# Patient Record
Sex: FEMALE | Race: WHITE | NOT HISPANIC OR LATINO
[De-identification: names, ages, dates, MRNs, and addresses within clinical notes are randomized per-mention and may not be internally consistent; named-entity substitution may affect disease eponyms.]

---

## 2019-11-06 ENCOUNTER — RESULT REVIEW (OUTPATIENT)
Age: 74
End: 2019-11-06

## 2020-08-18 ENCOUNTER — EMERGENCY (EMERGENCY)
Facility: HOSPITAL | Age: 75
LOS: 1 days | Discharge: HOME | End: 2020-08-18
Attending: STUDENT IN AN ORGANIZED HEALTH CARE EDUCATION/TRAINING PROGRAM
Payer: MEDICARE

## 2020-08-18 VITALS
DIASTOLIC BLOOD PRESSURE: 79 MMHG | HEIGHT: 61 IN | OXYGEN SATURATION: 97 % | WEIGHT: 119.93 LBS | TEMPERATURE: 98 F | SYSTOLIC BLOOD PRESSURE: 132 MMHG | RESPIRATION RATE: 18 BRPM | HEART RATE: 62 BPM

## 2020-08-18 VITALS
HEART RATE: 73 BPM | OXYGEN SATURATION: 98 % | TEMPERATURE: 99 F | RESPIRATION RATE: 18 BRPM | DIASTOLIC BLOOD PRESSURE: 84 MMHG | SYSTOLIC BLOOD PRESSURE: 162 MMHG

## 2020-08-18 PROCEDURE — 25605 CLTX DST RDL FX/EPHYS SEP W/: CPT | Mod: LT

## 2020-08-18 PROCEDURE — 73110 X-RAY EXAM OF WRIST: CPT

## 2020-08-18 PROCEDURE — 73090 X-RAY EXAM OF FOREARM: CPT | Mod: 26,LT

## 2020-08-18 PROCEDURE — 99285 EMERGENCY DEPT VISIT HI MDM: CPT | Mod: 25

## 2020-08-18 PROCEDURE — 73110 X-RAY EXAM OF WRIST: CPT | Mod: 26,LT

## 2020-08-18 PROCEDURE — 73090 X-RAY EXAM OF FOREARM: CPT

## 2020-08-18 PROCEDURE — 99284 EMERGENCY DEPT VISIT MOD MDM: CPT

## 2020-08-18 PROCEDURE — 73080 X-RAY EXAM OF ELBOW: CPT | Mod: 26,LT

## 2020-08-18 PROCEDURE — 73080 X-RAY EXAM OF ELBOW: CPT

## 2020-08-18 RX ORDER — ACETAMINOPHEN 500 MG
650 TABLET ORAL ONCE
Refills: 0 | Status: COMPLETED | OUTPATIENT
Start: 2020-08-18 | End: 2020-08-18

## 2020-08-18 RX ORDER — LIDOCAINE HCL 20 MG/ML
10 VIAL (ML) INJECTION ONCE
Refills: 0 | Status: DISCONTINUED | OUTPATIENT
Start: 2020-08-18 | End: 2020-08-22

## 2020-08-18 NOTE — CONSULT NOTE ADULT - SUBJECTIVE AND OBJECTIVE BOX
75yFemale c/o L wrist pain s/p mechanical fall 1 week ago. Patient denies head hit or LOC. Patient denies numbness or tingling in the LUE. Patient denies any other injuries.    PMH:  HLD (hyperlipidemia)  HTN (hypertension)    PSH:    AH:    Meds: See med rec    T(C): 36.7 (08-18-20 @ 11:29)  HR: 62 (08-18-20 @ 11:29)  BP: 132/79 (08-18-20 @ 11:29)  RR: 18 (08-18-20 @ 11:29)  SpO2: 97% (08-18-20 @ 11:29)  Wt(kg): --    Gen: NAD  PE LUE:  Skin intact,   visible deformity of wrist,   SILT med/rad/ulnar/axillary  +AIN/PIN/Ulnar/Radial/Musc/Median,   +shoulder/elbow ROM,   wrist ROM limited 2/2 pain,   +radial pulse, soft compartments,    Secondary:  No TTP over bony landmarks, SILT BL, ROM intact BL, distal pulses palpable.    Imaging:  XR demonstrating L distal radius fracture    Procedure:  Under aseptic conditions, a hematoma block was administered to the fracture site using 10cc of 1% lidocaine. Closed reduction was performed and a well molded plaster splint was applied. The patient tolerated the procedure well and there we no complications. The patient was neurovascularly intact following reduction. Post-reduction xrays demonstrated acceptable alignment.         A/P:  75yoF   s/p reduction of L distal radius fracture    - pain control  - NWB LUE in splint  - splint precuations  - possible need for future surgery explained to the patient  - FU with Dr. Garcia in 1 week. Please call office for an appointment. [phone]

## 2020-08-18 NOTE — ED ADULT NURSE NOTE - NSIMPLEMENTINTERV_GEN_ALL_ED
Implemented All Universal Safety Interventions:  [place] to call system. Call bell, personal items and telephone within reach. Instruct patient to call for assistance. Room bathroom lighting operational. Non-slip footwear when patient is off stretcher. Physically safe environment: no spills, clutter or unnecessary equipment. Stretcher in lowest position, wheels locked, appropriate side rails in place.

## 2020-08-18 NOTE — ED PROVIDER NOTE - CARE PROVIDER_API CALL
Cristino Garcia  ORTHOPAEDIC SURGERY  [address], [address]  [place] [address]  Phone: [phone]  Fax: [phone]  Follow Up Time:

## 2020-08-18 NOTE — ED PROVIDER NOTE - ADDITIONAL NOTES AND INSTRUCTIONS:
Please excuse Ms. Olivares's son for 8/18/20 and for whatever day she is scheduled to follow up with her outpatient doctor as he will need to provide transportation for her.

## 2020-08-18 NOTE — ED PROVIDER NOTE - PATIENT PORTAL LINK FT
You can access the FollowMyHealth Patient Portal offered by [facility] by registering at the following website: [email or web address]. By joining [facility]’s FollowMyHealth portal, you will also be able to view your health information using other applications (apps) compatible with our system.

## 2020-08-18 NOTE — ED PROVIDER NOTE - NS ED ROS FT
General: no weakness, no fatigue, no change in wt  HEENT: No congestion, no blurry vision, no odynophagia, no rhinorrhea, no ear pain, no throat pain  Respiratory: No cough, no shortness of breath  Cardiac: No chest pain, no palpitations  GI: No abdominal pain, no diarrhea, no vomiting, no nausea, no constipation  : No dysuria, no hematuria  MSK: (+) swelling in L distal arm and wrist; no back pain  Neuro: No headache, no dizziness

## 2020-08-18 NOTE — ED PROVIDER NOTE - PHYSICAL EXAMINATION
VITALS:   T(C): 36.7 (08-18-20 @ 11:29), Max: 36.7 (08-18-20 @ 11:29)  HR: 62 (08-18-20 @ 11:29) (62 - 62)  BP: 132/79 (08-18-20 @ 11:29) (132/79 - 132/79)  RR: 18 (08-18-20 @ 11:29) (18 - 18)  SpO2: 97% (08-18-20 @ 11:29) (97% - 97%)    GENERAL: NAD, lying in bed comfortably  HEAD:  Atraumatic, normocephalic  EYES: EOMI, PERRLA, conjunctiva and sclera clear  ENT: Moist mucous membranes  NECK: Supple, no JVD  HEART: Regular rate and rhythm, no murmurs, rubs, or gallops  LUNGS: Unlabored respirations.  Clear to auscultation bilaterally, no crackles, wheezing, or rhonchi  ABDOMEN: Soft, nontender, nondistended, +BS  EXTREMITIES: 1+ peripheral pulses bilaterally. (+) L distal radius/ulna ttp w/o swelling, bruising  NERVOUS SYSTEM:  A&Ox3, no focal deficits   SKIN: No rashes or lesions VITALS:   T(C): 36.7 (08-18-20 @ 11:29), Max: 36.7 (08-18-20 @ 11:29)  HR: 62 (08-18-20 @ 11:29) (62 - 62)  BP: 132/79 (08-18-20 @ 11:29) (132/79 - 132/79)  RR: 18 (08-18-20 @ 11:29) (18 - 18)  SpO2: 97% (08-18-20 @ 11:29) (97% - 97%)    GENERAL: NAD, lying in bed comfortably  HEAD:  Atraumatic, normocephalic  EYES: EOMI, PERRLA, conjunctiva and sclera clear  ENT: Moist mucous membranes  NECK: Supple, no JVD  HEART: Regular rate and rhythm, no murmurs, rubs, or gallops  LUNGS: Unlabored respirations.  Clear to auscultation bilaterally, no crackles, wheezing, or rhonchi  ABDOMEN: Soft, nontender, nondistended, +BS  EXTREMITIES: 1+ peripheral pulses bilaterally. (+) L distal radius/ulna ttp w/o swelling, bruising, w/ dec ROM; No tenderness in anatomical snuffbox  NERVOUS SYSTEM:  A&Ox3, no focal sensory deficits   SKIN: No rashes or lesions GENERAL: NAD, lying in bed comfortably  HEAD:  Atraumatic, normocephalic  EYES: EOMI, conjunctiva and sclera clear  ENT: Moist mucous membranes  NECK: Supple,   HEART: Regular rate and rhythm, no murmurs  LUNGS: Unlabored respirations.  Clear to auscultation bilaterally, no crackles, wheezing, or rhonchi  ABDOMEN: Soft, nontender, nondistended  EXTREMITIES: Equal radial/ulnar pulses b/l. (+) L distal radius tender to palpation w/o swelling, ecchymosis. L wrist w/ dec ROM 2/2 pain; No tenderness in anatomical snuffbox on L. No pain w/ axial loading/distraction of thumb on L  NERVOUS SYSTEM: Appears non focal

## 2020-08-18 NOTE — ED PROVIDER NOTE - OBJECTIVE STATEMENT
74 y/o F w/ PMH of HTN, HLD presenting w/ L wrist pain. Pt referred in from urgent care for wrist fx. Pt 74 y/o F w/ PMH of HTN, HLD presenting w/ L wrist pain. Pt referred in from urgent care for wrist fx. Pt slipped and fell backwards last Thursday, does not remember mechanism of L arm injury. Attempted cold/hot compresses and NSAIDs without relief. Seen at urgent care this AM, where she was put into arm splint and referred to ED for further treatment. Denies HA, dizziness, SOB, CP, abd pain, n/v/d, swelling, rashes. All other ROS negative at this time. 76 y/o F w/ PMH of HTN, HLD presenting w/ L wrist pain. Pt referred in from urgent care for wrist fx. Pt slipped and fell backwards last Thursday, does not remember mechanism of L arm injury. Attempted cold/hot compresses and NSAIDs without relief. Seen at urgent care this AM, where she was put into arm splint and referred to ED for further treatment. Denies tingling, numbness. Denies HA, dizziness, SOB, CP, abd pain, n/v/d, rashes. All other ROS negative at this time. 76 y/o F w/ PMH of HTN, HLD presenting w/ L wrist pain. Pt referred in from urgent care for wrist fx. Pt slipped and fell backwards last Thursday, does not remember mechanism of L arm injury. Attempted cold/hot compresses and NSAIDs without relief. Seen at urgent care this AM, where she was put into arm splint and referred to ED for further treatment. Pt had imaging at urgent care but is unsure of results. Denies tingling, numbness in extremity. Experiencing mild pain on L wrist. Denies fevers, chills, headache, dizziness, blurred vision, chest pain, cough, shortness of breath, abdominal pain, n/v/d/c, urinary symptoms, rash.

## 2020-08-18 NOTE — ED PROVIDER NOTE - CLINICAL SUMMARY MEDICAL DECISION MAKING FREE TEXT BOX
none
75F sent from urgent care in splint for left wrist fx.  Pt fell about a week ago and has had persistent left wrist pain.  Went to urgent care for xray which showed fx and was sent to ER.  Pt with mild radial left wrist pain, no snuff box ttp, 5/5  strength and equal radial pulses.  Will obtain xrays, pain control and either re-splint vs possible reduction pending results.  - Rae Gillis, DO

## 2020-08-18 NOTE — ED ADULT NURSE NOTE - OBJECTIVE STATEMENT
Ambulatory, well-appearing patient in no apparent distress complains of mechanical fall last week with L wrist pain.  Reports slipping backwards and broke fall with her wrist, has had continued left wrist pain and swelling since then, went to [facility] today who did imaging, placed left arm in sling and sent her here.  No pain meds today, declines them now.  Denies loc or head trauma at time of fall, no blood thinners.  Pt can move all fingers, no numbness/tingling.

## 2020-08-18 NOTE — ED PROVIDER NOTE - PROGRESS NOTE DETAILS
Dr. Calin Olvera, PGY-3: ortho bedside evaluating pt for radius fracture Dr. Calin Olvera, PGY-3: seen by ortho and splinted. Pt to f/u w/ Dr. Garcia in 1 week. Return precautions provided, pt verbalized understanding. Ready for DC.

## 2020-08-18 NOTE — ED PROVIDER NOTE - NSFOLLOWUPINSTRUCTIONS_ED_ALL_ED_FT
Fracture    A fracture is a break in one of your bones. This can occur from a variety of injuries, especially traumatic ones. Symptoms include pain, bruising, or swelling. Do not use the injured limb. If a fracture is in one of the bones below your waist, do not put weight on that limb unless instructed to do so by your healthcare provider. Crutches or a cane may have been provided. A splint or cast may have been applied by your health care provider. Make sure to keep it dry and follow up with an orthopedist as instructed.    SEEK IMMEDIATE MEDICAL CARE IF YOU HAVE ANY OF THE FOLLOWING SYMPTOMS: numbness, tingling, increasing pain, or weakness in any part of the injured limb.     1) Follow up with your doctor as needed, but please see the orthopedist in 1 week. You were provided with the contact information.  2) Return to the ED immediately for new or worsening symptoms  3) Please continue to take any home medications as prescribed  4) Your test results from your ED visit were discussed with you prior to discharge  5) You were provided with a copy of your test results  6) Please take tylenol 650 mg every 4 hours as needed for pain. Please do not exceed more than 4,000mg of Tylenol in a day

## 2020-08-18 NOTE — ED PROVIDER NOTE - NS_EDPROVIDERDISPOUSERTYPE_ED_A_ED
Attending Attestation (For Attendings USE Only)... Medical/PA/NP Students Attestation (For Medical/PA/NP Student USE Only).../Attending Attestation (For Attendings USE Only)...

## 2020-08-20 PROBLEM — I10 ESSENTIAL (PRIMARY) HYPERTENSION: Chronic | Status: ACTIVE | Noted: 2020-08-18

## 2020-08-20 PROBLEM — E78.5 HYPERLIPIDEMIA, UNSPECIFIED: Chronic | Status: ACTIVE | Noted: 2020-08-18

## 2020-08-20 PROBLEM — Z00.00 ENCOUNTER FOR PREVENTIVE HEALTH EXAMINATION: Status: ACTIVE | Noted: 2020-08-20

## 2020-08-24 ENCOUNTER — APPOINTMENT (OUTPATIENT)
Dept: ORTHOPEDIC SURGERY | Facility: CLINIC | Age: 75
End: 2020-08-24
Payer: MEDICARE

## 2020-08-24 DIAGNOSIS — S52.592A OTHER FRACTURES OF LOWER END OF LEFT RADIUS, INITIAL ENCOUNTER FOR CLOSED FRACTURE: ICD-10-CM

## 2020-08-24 PROCEDURE — 73110 X-RAY EXAM OF WRIST: CPT | Mod: LT

## 2020-08-24 PROCEDURE — 29075 APPL CST ELBW FNGR SHORT ARM: CPT | Mod: LT

## 2020-08-24 PROCEDURE — 99203 OFFICE O/P NEW LOW 30 MIN: CPT | Mod: 25

## 2020-08-24 RX ORDER — LOSARTAN POTASSIUM 50 MG/1
50 TABLET, FILM COATED ORAL
Qty: 90 | Refills: 0 | Status: ACTIVE | COMMUNITY
Start: 2020-03-09

## 2020-08-24 RX ORDER — METOPROLOL SUCCINATE 25 MG/1
25 TABLET, EXTENDED RELEASE ORAL
Qty: 90 | Refills: 0 | Status: ACTIVE | COMMUNITY
Start: 2020-06-08

## 2020-08-24 RX ORDER — ROSUVASTATIN CALCIUM 40 MG/1
40 TABLET, FILM COATED ORAL
Qty: 90 | Refills: 0 | Status: ACTIVE | COMMUNITY
Start: 2020-08-22

## 2020-08-24 RX ORDER — TRIAMTERENE AND HYDROCHLOROTHIAZIDE 37.5; 25 MG/1; MG/1
37.5-25 CAPSULE ORAL
Qty: 90 | Refills: 0 | Status: ACTIVE | COMMUNITY
Start: 2020-06-01

## 2020-08-24 NOTE — END OF VISIT
[FreeTextEntry3] : I, Cristino Garcia MD, ordering physician, have read and attest that all the information, medical decision making and discharge instructions within are true and accurate.

## 2020-08-24 NOTE — DISCUSSION/SUMMARY
[de-identified] : The underlying pathophysiology was reviewed with the patient. Treatment options were discussed including; observation, NSAIDS, and cast immobilization. \par \par A left short arm cast was applied. Cast care instructions were reviewed. \par NSAIDs as tolerated. \par Gentle ROM exercises involving the shoulder and elbow as well pumping the hand were advised.\par Follow up in 1 week for repeat x-rays.

## 2020-08-24 NOTE — RETURN TO WORK/SCHOOL
[FreeTextEntry1] : Ms. CHRISTA OLIVARES was seen in the office today on 08/24/2020 and evaluated by me for an Orthopedic visit. Ms. Olivares was accompanied by her son today. Please be advised that her son will be taking care of her and will be out of work until further notice. \par Sincerely, \rhonda Garcia MD

## 2020-08-24 NOTE — HISTORY OF PRESENT ILLNESS
[de-identified] : Pt is a 74 y/o RHD female with left wrist fracture.  She fell at home on 8/12/20 and had pain immediately.  She did not go to the ED initially.  She was seen at [facility] on 8/18/20 and they sent her to [place] ED.  The wrist was reduced and a splint was applied.  She was advised to follow up with a specialist.  She denies any numbness.\par She is here with her son who is helping at home.He would [employer] forms completed.

## 2020-08-24 NOTE — PHYSICAL EXAM
[de-identified] : Patient is WDWN, alert, and in no acute distress. Breathing is unlabored. She is grossly oriented to person, place, and time. \par \par Left wrist:\par [place]art[place] splint is removed.Edema, tenderness over distal radius.No deformity . Neurologically intact with brisk capillary refill in all five digits. Digits are moving freely. There is no swelling. Sensation is intact to light touch. \par \par Shoulder, elbow and digits with FROM [de-identified] : AP, lateral and oblique views of the left forearm were obtained today and revealed a well  aligned distal radial fracture.

## 2020-08-24 NOTE — ADDENDUM
[FreeTextEntry1] : I, Estela Ann wrote this note acting as a scribe for Dr. Cristino Garcia on Aug 24, 2020.

## 2020-08-31 ENCOUNTER — APPOINTMENT (OUTPATIENT)
Dept: ORTHOPEDIC SURGERY | Facility: CLINIC | Age: 75
End: 2020-08-31
Payer: MEDICARE

## 2020-08-31 DIAGNOSIS — Z86.79 PERSONAL HISTORY OF OTHER DISEASES OF THE CIRCULATORY SYSTEM: ICD-10-CM

## 2020-08-31 PROCEDURE — 73110 X-RAY EXAM OF WRIST: CPT | Mod: LT

## 2020-08-31 PROCEDURE — 99213 OFFICE O/P EST LOW 20 MIN: CPT

## 2020-08-31 NOTE — PHYSICAL EXAM
[de-identified] : Patient is WDWN, alert, and in no acute distress. Breathing is unlabored. She is grossly oriented to person, place, and time. \par \par Left wrist:\par Left short arm cast in  place..Edema in digits improved . Neurologically intact with brisk capillary refill in all five digits. Digits are moving freely. There is no swelling. Sensation is intact to light touch. \par \par Shoulder, elbow and digits with FROM [de-identified] : AP, lateral and oblique views of the left forearm were obtained today and revealed a well-aligned distal radial fracture that is healing well.

## 2020-08-31 NOTE — DISCUSSION/SUMMARY
[de-identified] : The underlying pathophysiology was reviewed with the patient. Treatment options were discussed including; observation, NSAIDS, and cast immobilization. \par \par Patient was informed that she does not need surgery.\par Mole skin was attached to the cast per the patient's request. \par NSAIDs as tolerated. \par Gentle ROM exercises involving the shoulder and elbow as well pumping the hand were advised.\par A note for her son was given for [employer] forms. \par Follow up in 2 week for repeat x-rays.

## 2020-08-31 NOTE — RETURN TO WORK/SCHOOL
[FreeTextEntry1] : Ms. CHRISTA OLIVARES was seen in the office today on 08/31/2020 and evaluated by me for an Orthopedic visit. Ms. Olivares was accompanied by her son, Abran Salinas today. Please be advised that her son will be taking care of her and will be needed to accompany her to future doctor visits. \par \par Sincerely, \par Cristino Garcia MD

## 2020-08-31 NOTE — HISTORY OF PRESENT ILLNESS
[de-identified] : Pt is a 76 y/o RHD female with left wrist fracture.  She fell at home on 8/12/20 and had pain immediately.  She did not go to the ED initially.  She was seen at [facility] on 8/18/20 and they sent her to [place] ED.  The wrist was reduced and a splint was applied.  She was advised to follow up with a specialist.  She denies any numbness.\par She is here with her son who is helping at home.He would [employer] forms completed. She returns today a week after the cast was applied for follow up xrays.

## 2020-08-31 NOTE — ADDENDUM
[FreeTextEntry1] : I, Estela Ann wrote this note acting as a scribe for Dr. Cristino Garcia on Aug 31, 2020.

## 2020-09-14 ENCOUNTER — APPOINTMENT (OUTPATIENT)
Dept: ORTHOPEDIC SURGERY | Facility: CLINIC | Age: 75
End: 2020-09-14
Payer: MEDICARE

## 2020-09-14 PROCEDURE — 73110 X-RAY EXAM OF WRIST: CPT | Mod: LT

## 2020-09-14 PROCEDURE — 99213 OFFICE O/P EST LOW 20 MIN: CPT

## 2020-09-14 RX ORDER — EZETIMIBE 10 MG/1
10 TABLET ORAL
Qty: 30 | Refills: 0 | Status: ACTIVE | COMMUNITY
Start: 2020-09-11

## 2020-09-16 NOTE — HISTORY OF PRESENT ILLNESS
[de-identified] : Pt is a 76 y/o RHD female with left wrist fracture.  She fell at home on 8/12/20 and had pain immediately.  She did not go to the ED initially.  She was seen at [facility] on 8/18/20 and they sent her to [place] ED.  The wrist was reduced and a splint was applied.  She was advised to follow up with a specialist.  She denies any numbness.\par She is here with her son who is helping at home.He would like [employer] forms completed. She returns today a week after the cast was applied for follow up xrays. Patient has returned at the 4 week lorrie. Per the patient's son, the patient is having a hard time sleeping at night due to the cast. She states that she has no pain and that it is hard for her to do anything with the cast.

## 2020-09-16 NOTE — PHYSICAL EXAM
[de-identified] : Patient is WDWN, alert, and in no acute distress. Breathing is unlabored. She is grossly oriented to person, place, and time. \par \par Left wrist:\par Left short arm cast in  place..Edema in digits improved . Neurologically intact with brisk capillary refill in all five digits. Digits are moving freely. There is no swelling. Sensation is intact to light touch. \par \par Shoulder, elbow and digits with FROM [de-identified] : AP, lateral and oblique views of the left forearm with the cast were obtained today and revealed a well-aligned distal radial fracture that is healing well.

## 2020-09-16 NOTE — DISCUSSION/SUMMARY
[de-identified] : The underlying pathophysiology was reviewed with the patient. Treatment options were discussed including; observation, NSAIDS, and cast immobilization. \par \par Patient was informed that her fracture is healing well. \par NSAIDs as tolerated. \par Gentle ROM exercises involving the shoulder and elbow as well pumping the hand were advised.\par A note for her son was given for [employer] forms. \par Follow up in 2 week for cast removal.

## 2020-09-16 NOTE — ADDENDUM
[FreeTextEntry1] : I, Estela Ann wrote this note acting as a scribe for Dr. Cristino Garcia on Sep 14, 2020.

## 2020-09-24 ENCOUNTER — APPOINTMENT (OUTPATIENT)
Dept: ORTHOPEDIC SURGERY | Facility: CLINIC | Age: 75
End: 2020-09-24
Payer: MEDICARE

## 2020-09-24 DIAGNOSIS — S52.502A UNSPECIFIED FRACTURE OF THE LOWER END OF LEFT RADIUS, INITIAL ENCOUNTER FOR CLOSED FRACTURE: ICD-10-CM

## 2020-09-24 PROCEDURE — 99213 OFFICE O/P EST LOW 20 MIN: CPT

## 2020-09-24 PROCEDURE — 73110 X-RAY EXAM OF WRIST: CPT | Mod: LT

## 2020-09-24 NOTE — PHYSICAL EXAM
[de-identified] : Patient is WDWN, alert, and in no acute distress. Breathing is unlabored. She is grossly oriented to person, place, and time. \par \par Left wrist:\par Cast Removal: Skin intact, No tenderness over fx site, No swelling, no deformities, Fingers well perfused and moving freely with capillary refill of less than 2 seconds, Neurovascularly intact, Sensation intact \par \par Shoulder, elbow and digits with FROM [de-identified] : AP, lateral and oblique views of the left forearm with the cast were obtained today and revealed a well-aligned distal radial fracture that is healed.

## 2020-09-24 NOTE — DISCUSSION/SUMMARY
[de-identified] : Cast was removed in the office. \par NSAIDs as tolerated. \par Patient is advised to use a brace for heavy lifting.\par The patient was advised to soak the hand in warm water and Epsom salt. \par Gentle ROM exercises involving the shoulder and elbow as well pumping the hand were advised.\par Follow up in 6 weeks.

## 2020-09-24 NOTE — ADDENDUM
[FreeTextEntry1] : I, Estela Ann wrote this note acting as a scribe for Dr. Cristino Garcia on Sep 24, 2020.

## 2020-09-24 NOTE — HISTORY OF PRESENT ILLNESS
[de-identified] : Pt is a 76 y/o RHD female with left wrist fracture.  She fell at home on 8/12/20 and had pain immediately.  She did not go to the ED initially.  She was seen at [facility] on 8/18/20 and they sent her to [place] ED.  The wrist was reduced and a splint was applied.  She was advised to follow up with a specialist.  She denies any numbness.\par She is here with her son who is helping at home.He would like [employer] forms completed. She returns today a week after the cast was applied for follow up xrays. Patient has returned at the 4 week lorrie. Per the patient's son, the patient is having a hard time sleeping at night due to the cast. She states that she has no pain and that it is hard for her to do anything with the cast.

## 2020-11-05 ENCOUNTER — APPOINTMENT (OUTPATIENT)
Dept: ORTHOPEDIC SURGERY | Facility: CLINIC | Age: 75
End: 2020-11-05
Payer: MEDICARE

## 2020-11-05 DIAGNOSIS — S52.502D UNSPECIFIED FRACTURE OF THE LOWER END OF LEFT RADIUS, SUBSEQUENT ENCOUNTER FOR CLOSED FRACTURE WITH ROUTINE HEALING: ICD-10-CM

## 2020-11-05 DIAGNOSIS — S52.602D UNSPECIFIED FRACTURE OF THE LOWER END OF LEFT RADIUS, SUBSEQUENT ENCOUNTER FOR CLOSED FRACTURE WITH ROUTINE HEALING: ICD-10-CM

## 2020-11-05 PROCEDURE — 99072 ADDL SUPL MATRL&STAF TM PHE: CPT

## 2020-11-05 PROCEDURE — 99214 OFFICE O/P EST MOD 30 MIN: CPT

## 2020-11-05 PROCEDURE — 73110 X-RAY EXAM OF WRIST: CPT | Mod: LT

## 2020-11-06 NOTE — PHYSICAL EXAM
[de-identified] : Patient is WDWN, alert, and in no acute distress. Breathing is unlabored. She is grossly oriented to person, place, and time. \par \par Left wrist: Good ROM \par \par Shoulder, elbow and digits with FROM [de-identified] : AP, lateral and oblique views of the left forearm with the cast were obtained today and revealed a well-aligned distal radial fracture that is healed.

## 2020-11-06 NOTE — HISTORY OF PRESENT ILLNESS
[de-identified] : Pt is a 74 y/o RHD female with left wrist fracture.  She fell at home on 8/12/20 and had pain immediately.  She did not go to the ED initially.  She was seen at [facility] on 8/18/20 and they sent her to [place] ED.  The wrist was reduced and a splint was applied.  She was advised to follow up with a specialist.  She denies any numbness.\par She is here with her son who is helping at home.He would like [employer] forms completed. She returns today a week after the cast was applied for follow up xrays. Patient has returned at the 4 week lorrie. Per the patient's son, the patient is having a hard time sleeping at night due to the cast. She states that she has no pain and that it is hard for her to do anything with the cast.  She returns stating that she is not experiencing pain at this time.

## 2020-11-06 NOTE — RETURN TO WORK/SCHOOL
[FreeTextEntry1] : Ms. CHRISTA OLIVARES was seen in the office today on 11/05/2020 and evaluated by me for an Orthopedic visit. Ms. Olivares was accompanied by her son, Abran Salinas today. Please be advised that her son accompanied her to today's visit. \par \par Sincerely, \par Cristino Garcia MD

## 2020-11-06 NOTE — DISCUSSION/SUMMARY
[de-identified] : NSAIDs as tolerated. \par Patient is advised to use a brace for heavy lifting.\par The patient was advised to soak the hand in warm water and Epsom salt. \par Gentle ROM exercises involving the shoulder and elbow as well pumping the hand were advised.\par Follow up in 3 months if needed.

## 2020-11-06 NOTE — ADDENDUM
[FreeTextEntry1] : I, Estela Ann wrote this note acting as a scribe for Dr. Cristino Garcia on Nov 05, 2020.